# Patient Record
Sex: FEMALE | Race: WHITE | NOT HISPANIC OR LATINO | Employment: FULL TIME | URBAN - METROPOLITAN AREA
[De-identification: names, ages, dates, MRNs, and addresses within clinical notes are randomized per-mention and may not be internally consistent; named-entity substitution may affect disease eponyms.]

---

## 2021-07-14 LAB — SARS-COV-2 RNA RESP QL NAA+PROBE: NEGATIVE

## 2021-07-15 ENCOUNTER — HISTORICAL (OUTPATIENT)
Dept: ADMINISTRATIVE | Facility: HOSPITAL | Age: 21
End: 2021-07-15

## 2021-07-15 LAB — SARS-COV-2 RNA RESP QL NAA+PROBE: NEGATIVE

## 2021-07-18 LAB — FINAL CULTURE: NORMAL

## 2022-04-11 ENCOUNTER — HISTORICAL (OUTPATIENT)
Dept: ADMINISTRATIVE | Facility: HOSPITAL | Age: 22
End: 2022-04-11

## 2022-04-24 VITALS
OXYGEN SATURATION: 98 % | WEIGHT: 170.63 LBS | SYSTOLIC BLOOD PRESSURE: 124 MMHG | DIASTOLIC BLOOD PRESSURE: 82 MMHG | BODY MASS INDEX: 27.42 KG/M2 | HEIGHT: 66 IN

## 2022-06-01 ENCOUNTER — OFFICE VISIT (OUTPATIENT)
Dept: URGENT CARE | Facility: CLINIC | Age: 22
End: 2022-06-01
Payer: COMMERCIAL

## 2022-06-01 VITALS
TEMPERATURE: 99 F | WEIGHT: 185 LBS | HEART RATE: 106 BPM | HEIGHT: 64 IN | DIASTOLIC BLOOD PRESSURE: 85 MMHG | OXYGEN SATURATION: 99 % | BODY MASS INDEX: 31.58 KG/M2 | RESPIRATION RATE: 16 BRPM | SYSTOLIC BLOOD PRESSURE: 126 MMHG

## 2022-06-01 DIAGNOSIS — L02.01 FACIAL ABSCESS: Primary | ICD-10-CM

## 2022-06-01 PROCEDURE — 99213 OFFICE O/P EST LOW 20 MIN: CPT | Mod: S$PBB,,, | Performed by: NURSE PRACTITIONER

## 2022-06-01 PROCEDURE — 99213 OFFICE O/P EST LOW 20 MIN: CPT | Mod: PBBFAC

## 2022-06-01 PROCEDURE — 99213 PR OFFICE/OUTPT VISIT, EST, LEVL III, 20-29 MIN: ICD-10-PCS | Mod: S$PBB,,, | Performed by: NURSE PRACTITIONER

## 2022-06-01 RX ORDER — FLUTICASONE PROPIONATE 50 MCG
SPRAY, SUSPENSION (ML) NASAL
COMMUNITY
Start: 2021-07-15

## 2022-06-01 RX ORDER — DOXYCYCLINE 100 MG/1
100 CAPSULE ORAL 2 TIMES DAILY
Qty: 14 CAPSULE | Refills: 0 | Status: SHIPPED | OUTPATIENT
Start: 2022-06-01 | End: 2022-06-08

## 2022-06-01 RX ORDER — MUPIROCIN 20 MG/G
OINTMENT TOPICAL
Qty: 22 G | Refills: 1 | Status: SHIPPED | OUTPATIENT
Start: 2022-06-01

## 2022-06-01 NOTE — PROGRESS NOTES
"Subjective:      Patient ID: Aura Lobato is a 21 y.o. female.    Chief Complaint: Other Misc (Infected pimple causing face to swell - Entered by patient) and Facial Swelling ([place] [facility] [place] to right facial area. )    To 1-year-old female presents to the clinic reporting of a scan infection to her right mid face.  Patient stated pimple got infected.  Patient states she has been applying Neosporin, moist compress, hydrogen peroxide has not helping.  Denies any fever or headache or dizziness,    Review of Systems   Integumentary:  Positive for rash.   All other systems reviewed and are negative.      /85 (BP Location: Right arm, Patient Position: Sitting, BP Method: Medium (Automatic))   Pulse 106   Temp 98.6 °F (37 °C) (Oral)   Resp 16   Ht 5' 4" (1.626 m)   Wt 83.9 kg (185 lb)   SpO2 99%   BMI 31.76 kg/m²      Current Outpatient Medications:     fluticasone propionate (FLONASE ALLERGY RELIEF) 50 mcg/actuation nasal spray, by Nasal route., Disp: , Rfl:     doxycycline (MONODOX) 100 MG capsule, Take 1 capsule (100 mg total) by mouth 2 (two) times daily. for 7 days, Disp: 14 capsule, Rfl: 0    mupirocin (BACTROBAN) 2 % ointment, Apply to affected area 2 times daily for 5 days, Disp: 22 g, Rfl: 1    Objective:     Physical Exam  Vitals and nursing note reviewed.   Constitutional:       Appearance: Normal appearance.   HENT:      Head: Normocephalic and atraumatic.      Right Ear: Tympanic membrane, ear canal and external ear normal.      Left Ear: Tympanic membrane, ear canal and external ear normal.      Nose: Nose normal.      Mouth/Throat:      Mouth: Mucous membranes are moist.   Eyes:      Extraocular Movements: Extraocular movements intact.      Conjunctiva/sclera: Conjunctivae normal.      Pupils: Pupils are equal, round, and reactive to light.   Cardiovascular:      Rate and Rhythm: Normal rate and regular rhythm.      Pulses: Normal pulses.      " Heart sounds: Normal heart sounds. No murmur heard.  Pulmonary:      Effort: Pulmonary effort is normal.      Breath sounds: Normal breath sounds.   Musculoskeletal:         General: Normal range of motion.      Cervical back: Normal range of motion and neck supple. No rigidity or tenderness.   Lymphadenopathy:      Cervical: No cervical adenopathy.   Skin:     Capillary Refill: Capillary refill takes less than 2 seconds.      Findings: Erythema (Right sided facial infected pimple.  Firm to touch, localized erythema without drainage, firmness is about 2 cm x 2 cm.) present.   Neurological:      General: No focal deficit present.      Mental Status: She is alert and oriented to person, place, and time. Mental status is at baseline.   Psychiatric:         Mood and Affect: Mood normal.         Behavior: Behavior normal.         Thought Content: Thought content normal.         Judgment: Judgment normal.       Assessment:     Problem List Items Addressed This Visit    None     Visit Diagnoses     Facial abscess    -  Primary    Relevant Medications    doxycycline (MONODOX) 100 MG capsule    mupirocin (BACTROBAN) 2 % ointment          Plan:   Discussed physical exam findings with patient will treat for abscess,.  Neosporin, start Hydrea peroxide, continue with moist warm compress.  Keep area clean.  Discussed medication  prescribed with patient, Rx topical mupirocin, doxycycline 100 mg b.i.d. for 7 days  Instructed patient to notify his/ her primary care provider regarding the visit today and schedule a follow-up appointment in 2-3 days if needed   instructed patient to come back to the clinic or go to nearest emergency room department if symptoms worsens or no improvement or for any other reason   questions elicited and answered  Patient verbalized understanding of discharge instructions, verbalizes understanding of medication prescribed any issues, verbalizes understanding to read discharge instructions    Facial  abscess  -     doxycycline (MONODOX) 100 MG capsule; Take 1 capsule (100 mg total) by mouth 2 (two) times daily. for 7 days  Dispense: 14 capsule; Refill: 0  -     mupirocin (BACTROBAN) 2 % ointment; Apply to affected area 2 times daily for 5 days  Dispense: 22 g; Refill: 1         Recent Lab Results     None                  This note was created with the assistance of a voice recognition software or  phone dictation. There may be transcription errors as a result of using this technology, however minimal effort has been made to assure accuracy of transcription, but any obvious errors or omissions should be clarified with the author of the document.

## 2023-06-19 ENCOUNTER — OFFICE VISIT (OUTPATIENT)
Dept: URGENT CARE | Facility: CLINIC | Age: 23
End: 2023-06-19

## 2023-06-19 VITALS
OXYGEN SATURATION: 99 % | HEART RATE: 121 BPM | BODY MASS INDEX: 30.49 KG/M2 | SYSTOLIC BLOOD PRESSURE: 131 MMHG | DIASTOLIC BLOOD PRESSURE: 84 MMHG | RESPIRATION RATE: 18 BRPM | TEMPERATURE: 99 F | WEIGHT: 183 LBS | HEIGHT: 65 IN

## 2023-06-19 DIAGNOSIS — J02.9 SORE THROAT: ICD-10-CM

## 2023-06-19 DIAGNOSIS — J02.0 STREP THROAT: Primary | ICD-10-CM

## 2023-06-19 LAB
CTP QC/QA: YES
MOLECULAR STREP A: POSITIVE

## 2023-06-19 PROCEDURE — 87651 STREP A DNA AMP PROBE: CPT | Mod: PBBFAC

## 2023-06-19 PROCEDURE — 99214 OFFICE O/P EST MOD 30 MIN: CPT | Mod: PBBFAC

## 2023-06-19 PROCEDURE — 99213 OFFICE O/P EST LOW 20 MIN: CPT | Mod: S$PBB,,,

## 2023-06-19 PROCEDURE — 99213 PR OFFICE/OUTPT VISIT, EST, LEVL III, 20-29 MIN: ICD-10-PCS | Mod: S$PBB,,,

## 2023-06-19 RX ORDER — AMOXICILLIN 500 MG/1
500 TABLET, FILM COATED ORAL EVERY 12 HOURS
Qty: 20 TABLET | Refills: 0 | Status: SHIPPED | OUTPATIENT
Start: 2023-06-19 | End: 2023-06-29

## 2023-06-19 NOTE — PROGRESS NOTES
"Subjective:      Patient ID: Aura Lobato is a 22 y.o. female.    Vitals:  height is 5' 5" (1.651 m) and weight is 83 kg (183 lb). Her oral temperature is 98.5 °F (36.9 °C). Her blood pressure is 131/84 and her pulse is 121 (abnormal). Her respiration is 18 and oxygen saturation is 99%.     Chief Complaint: Sore Throat (X 3 days)    PT states sore throat and inflamed tonsils for the last 3 days. Denies other symptoms.     Sore Throat       Constitution: Negative.   HENT:  Positive for sore throat.    Neck: neck negative.   Cardiovascular: Negative.    Eyes: Negative.    Respiratory: Negative.     Gastrointestinal: Negative.    Genitourinary: Negative.    Musculoskeletal: Negative.    Skin: Negative.    Neurological: Negative.     Objective:     Physical Exam   Constitutional: She is oriented to person, place, and time.   HENT:   Head: Normocephalic.   Nose: Nose normal.   Mouth/Throat: Uvula is midline and mucous membranes are normal. Mucous membranes are moist. Oropharyngeal exudate and posterior oropharyngeal erythema present.   Eyes: Pupils are equal, round, and reactive to light.   Cardiovascular: Regular rhythm, normal heart sounds and normal pulses. Tachycardia present.   Pulmonary/Chest: Effort normal and breath sounds normal.   Abdominal: Normal appearance. Soft.   Musculoskeletal: Normal range of motion.         General: Normal range of motion.   Neurological: She is alert and oriented to person, place, and time.   Skin: Skin is warm and dry.   Vitals reviewed.  Results for orders placed or performed in visit on 06/19/23   POCT Strep A, Molecular   Result Value Ref Range    Molecular Strep A, POC Positive (A) Negative     Acceptable Yes        Assessment:     1. Strep throat    2. Sore throat        Plan:       Strep throat  -     amoxicillin (AMOXIL) 500 MG Tab; Take 1 tablet (500 mg total) by mouth every 12 (twelve) hours. for 10 days  Dispense: 20 tablet; Refill: 0    Sore " throat  -     POCT Strep A, Molecular      - Start antibiotics today.  - Easy to swallow foods such as applesauce, smoothies, protein shakes, grits, oatmeal.  - Alternate OTC pain/fever reducers every 4 hours today and tomorrow.  - Out of school and/or work until you have taken 24 hours of antibiotics and are fever free for 24 hours.  - New tooth brush   - Strep IS CONTAGIOUS and is spread by spit. Do not share food, drinks, utensils, cosmetics, or saliva in any way until you are done with antibiotics.     ER precautions given, patient verbalized understanding.     Please see provided patient education for guidance.    Follow up with PCP or return to clinic if symptoms worsen or do not improve.

## 2023-06-19 NOTE — LETTER
June 19, 2023      [facility]  [facility] [facility] [address]  Phone: [phone]       Patient: Aura Lobato   YOB: 2000  Date of Visit: 06/19/2023    To Whom It May Concern:    Shant Lobato  was at [facility] on 06/19/2023. The patient may return to work/school on 6/21/2023 with no restrictions. If you have any questions or concerns, or if I can be of further assistance, please do not hesitate to contact me.    Sincerely,    STACIA Kurtz

## 2023-10-16 ENCOUNTER — OFFICE VISIT (OUTPATIENT)
Dept: URGENT CARE | Facility: CLINIC | Age: 23
End: 2023-10-16

## 2023-10-16 VITALS
HEART RATE: 104 BPM | WEIGHT: 182 LBS | BODY MASS INDEX: 30.32 KG/M2 | OXYGEN SATURATION: 97 % | HEIGHT: 65 IN | SYSTOLIC BLOOD PRESSURE: 133 MMHG | RESPIRATION RATE: 16 BRPM | DIASTOLIC BLOOD PRESSURE: 88 MMHG | TEMPERATURE: 99 F

## 2023-10-16 DIAGNOSIS — R09.89 SYMPTOMS OF UPPER RESPIRATORY INFECTION (URI): Primary | ICD-10-CM

## 2023-10-16 DIAGNOSIS — J02.0 STREP PHARYNGITIS: ICD-10-CM

## 2023-10-16 LAB
CTP QC/QA: YES
FLUAV AG NPH QL: NEGATIVE
FLUBV AG NPH QL: NEGATIVE
MOLECULAR STREP A: NEGATIVE
SARS-COV-2 RDRP RESP QL NAA+PROBE: NEGATIVE

## 2023-10-16 PROCEDURE — 99214 OFFICE O/P EST MOD 30 MIN: CPT | Mod: PBBFAC

## 2023-10-16 PROCEDURE — 87635 SARS-COV-2 COVID-19 AMP PRB: CPT | Mod: PBBFAC

## 2023-10-16 PROCEDURE — 87804 INFLUENZA ASSAY W/OPTIC: CPT | Mod: 59,PBBFAC

## 2023-10-16 PROCEDURE — 99214 OFFICE O/P EST MOD 30 MIN: CPT | Mod: S$PBB,,,

## 2023-10-16 PROCEDURE — 87651 STREP A DNA AMP PROBE: CPT | Mod: PBBFAC

## 2023-10-16 PROCEDURE — 99214 PR OFFICE/OUTPT VISIT, EST, LEVL IV, 30-39 MIN: ICD-10-PCS | Mod: S$PBB,,,

## 2023-10-16 RX ORDER — LORATADINE 10 MG/1
10 TABLET ORAL DAILY
Qty: 30 TABLET | Refills: 0 | Status: SHIPPED | OUTPATIENT
Start: 2023-10-16 | End: 2023-11-15

## 2023-10-16 RX ORDER — FLUTICASONE PROPIONATE 50 MCG
1 SPRAY, SUSPENSION (ML) NASAL DAILY
Qty: 9.9 ML | Refills: 0 | Status: SHIPPED | OUTPATIENT
Start: 2023-10-16

## 2023-10-16 RX ORDER — AMOXICILLIN 875 MG/1
875 TABLET, FILM COATED ORAL EVERY 12 HOURS
Qty: 20 TABLET | Refills: 0 | Status: SHIPPED | OUTPATIENT
Start: 2023-10-16 | End: 2023-10-26

## 2023-10-16 RX ORDER — GUAIFENESIN/DEXTROMETHORPHAN 100-10MG/5
5 SYRUP ORAL EVERY 6 HOURS PRN
Qty: 118 ML | Refills: 0 | Status: SHIPPED | OUTPATIENT
Start: 2023-10-16 | End: 2023-10-26

## 2023-10-16 NOTE — PROGRESS NOTES
"Subjective:      Patient ID: Aura Lobato is a 22 y.o. female.    Vitals:  height is 5' 5" (1.651 m) and weight is 82.6 kg (182 lb). Her temperature is 98.8 °F (37.1 °C). Her blood pressure is 133/88 and her pulse is 104. Her respiration is 16 and oxygen saturation is 97%.     Chief Complaint: URI (Sore throat, congestion, cough since last night.)    Pt states sore throat, cough and congestion since last night. Denies known sick contacts.    URI   Associated symptoms include congestion, coughing and a sore throat.       Constitution: Negative.   HENT:  Positive for congestion and sore throat.    Neck: neck negative.   Cardiovascular: Negative.    Eyes: Negative.    Respiratory:  Positive for cough.    Gastrointestinal: Negative.    Genitourinary: Negative.    Musculoskeletal: Negative.    Skin: Negative.    Neurological: Negative.       Objective:     Physical Exam   Constitutional: She is oriented to person, place, and time.   HENT:   Head: Normocephalic.   Ears:   Right Ear: Tympanic membrane, external ear and ear canal normal.   Left Ear: Tympanic membrane, external ear and ear canal normal.   Nose: Nose normal.   Mouth/Throat: Uvula is midline and mucous membranes are normal. Mucous membranes are moist. Oropharyngeal exudate and posterior oropharyngeal erythema present.   Eyes: Pupils are equal, round, and reactive to light.   Cardiovascular: Normal rate, regular rhythm, normal heart sounds and normal pulses.   Pulmonary/Chest: Effort normal and breath sounds normal.   Abdominal: Normal appearance. Soft.   Musculoskeletal: Normal range of motion.         General: Normal range of motion.   Neurological: She is alert and oriented to person, place, and time.   Skin: Skin is warm and dry.   Vitals reviewed.    Results for orders placed or performed in visit on 10/16/23   POCT Strep A, Molecular   Result Value Ref Range    Molecular Strep A, POC Negative Negative     Acceptable Yes  "   POCT COVID-19 Rapid Screening   Result Value Ref Range    POC Rapid COVID Negative Negative     Acceptable Yes    POCT Influenza A/B   Result Value Ref Range    Rapid Influenza A Ag Negative Negative    Rapid Influenza B Ag Negative Negative     Acceptable Yes        Assessment:     1. Symptoms of upper respiratory infection (URI)    2. Strep pharyngitis        Plan:       Symptoms of upper respiratory infection (URI)  -     POCT Strep A, Molecular  -     POCT COVID-19 Rapid Screening  -     POCT Influenza A/B  -     dextromethorphan-guaiFENesin  mg/5 ml (ROBITUSSIN-DM)  mg/5 mL liquid; Take 5 mLs by mouth every 6 (six) hours as needed (Cough).  Dispense: 118 mL; Refill: 0  -     loratadine (CLARITIN) 10 mg tablet; Take 1 tablet (10 mg total) by mouth once daily.  Dispense: 30 tablet; Refill: 0  -     fluticasone propionate (FLONASE) 50 mcg/actuation nasal spray; 1 spray (50 mcg total) by Each Nostril route once daily.  Dispense: 9.9 mL; Refill: 0    Strep pharyngitis  -     amoxicillin (AMOXIL) 875 MG tablet; Take 1 tablet (875 mg total) by mouth every 12 (twelve) hours. for 10 days  Dispense: 20 tablet; Refill: 0    Pt had exudate and erythema, will treat for strep.    ER precautions given, patient verbalized understanding.     Please see provided patient education for guidance.    Follow up with PCP or return to clinic if symptoms worsen or do not improve.

## 2023-10-16 NOTE — LETTER
October 16, 2023      [facility]  [facility] [facility] [address]  Phone: [phone]       Patient: Aura Lobato   YOB: 2000  Date of Visit: 10/16/2023    To Whom It May Concern:    Shant Lobato  was at [facility] on 10/16/2023. The patient may return to work/school on 10/18/23 with no restrictions. If you have any questions or concerns, or if I can be of further assistance, please do not hesitate to contact me.    Sincerely,    ELIO William NP